# Patient Record
Sex: FEMALE | Race: WHITE | URBAN - NONMETROPOLITAN AREA
[De-identification: names, ages, dates, MRNs, and addresses within clinical notes are randomized per-mention and may not be internally consistent; named-entity substitution may affect disease eponyms.]

---

## 2017-05-10 ENCOUNTER — TRANSFERRED RECORDS (OUTPATIENT)
Dept: HEALTH INFORMATION MANAGEMENT | Facility: HOSPITAL | Age: 22
End: 2017-05-10

## 2017-05-10 ENCOUNTER — HOSPITAL ENCOUNTER (INPATIENT)
Facility: HOSPITAL | Age: 22
LOS: 5 days | Discharge: HOME | DRG: 885 | End: 2017-05-15
Attending: PSYCHIATRY & NEUROLOGY | Admitting: PSYCHIATRY & NEUROLOGY
Payer: COMMERCIAL

## 2017-05-10 DIAGNOSIS — F31.81 BIPOLAR 2 DISORDER (H): Primary | ICD-10-CM

## 2017-05-10 PROBLEM — F29 PSYCHOSIS (H): Status: ACTIVE | Noted: 2017-05-10

## 2017-05-10 LAB
ALBUMIN SERPL-MCNC: 4.2 G/DL (ref 3.4–5)
ALP SERPL-CCNC: 47 U/L (ref 40–150)
ALT SERPL W P-5'-P-CCNC: 23 U/L (ref 0–50)
AST SERPL W P-5'-P-CCNC: 22 U/L (ref 0–45)
BILIRUB DIRECT SERPL-MCNC: 0.2 MG/DL (ref 0–0.2)
BILIRUB SERPL-MCNC: 0.5 MG/DL (ref 0.2–1.3)
HCG UR QL: NEGATIVE
PROT SERPL-MCNC: 8.3 G/DL (ref 6.8–8.8)
TSH SERPL DL<=0.005 MIU/L-ACNC: 1.04 MU/L (ref 0.4–4)

## 2017-05-10 PROCEDURE — 80076 HEPATIC FUNCTION PANEL: CPT | Performed by: NURSE PRACTITIONER

## 2017-05-10 PROCEDURE — 82306 VITAMIN D 25 HYDROXY: CPT | Performed by: NURSE PRACTITIONER

## 2017-05-10 PROCEDURE — 25000132 ZZH RX MED GY IP 250 OP 250 PS 637: Performed by: NURSE PRACTITIONER

## 2017-05-10 PROCEDURE — 99223 1ST HOSP IP/OBS HIGH 75: CPT | Performed by: NURSE PRACTITIONER

## 2017-05-10 PROCEDURE — 25000125 ZZHC RX 250: Performed by: NURSE PRACTITIONER

## 2017-05-10 PROCEDURE — S0166 INJ OLANZAPINE 2.5MG: HCPCS | Performed by: NURSE PRACTITIONER

## 2017-05-10 PROCEDURE — 36415 COLL VENOUS BLD VENIPUNCTURE: CPT | Performed by: NURSE PRACTITIONER

## 2017-05-10 PROCEDURE — 84443 ASSAY THYROID STIM HORMONE: CPT | Performed by: NURSE PRACTITIONER

## 2017-05-10 PROCEDURE — 12400000 ZZH R&B MH

## 2017-05-10 PROCEDURE — 81025 URINE PREGNANCY TEST: CPT | Performed by: NURSE PRACTITIONER

## 2017-05-10 RX ORDER — LAMOTRIGINE 25 MG/1
25 TABLET ORAL DAILY
Status: DISCONTINUED | OUTPATIENT
Start: 2017-05-10 | End: 2017-05-15 | Stop reason: HOSPADM

## 2017-05-10 RX ORDER — ACETAMINOPHEN 325 MG/1
650 TABLET ORAL EVERY 4 HOURS PRN
Status: DISCONTINUED | OUTPATIENT
Start: 2017-05-10 | End: 2017-05-15 | Stop reason: HOSPADM

## 2017-05-10 RX ORDER — OLANZAPINE 10 MG/1
10 TABLET ORAL
Status: DISCONTINUED | OUTPATIENT
Start: 2017-05-10 | End: 2017-05-15 | Stop reason: HOSPADM

## 2017-05-10 RX ORDER — HYDROXYZINE HYDROCHLORIDE 25 MG/1
25-50 TABLET, FILM COATED ORAL EVERY 4 HOURS PRN
Status: DISCONTINUED | OUTPATIENT
Start: 2017-05-10 | End: 2017-05-15 | Stop reason: HOSPADM

## 2017-05-10 RX ORDER — LITHIUM CARBONATE 300 MG/1
300 TABLET, FILM COATED, EXTENDED RELEASE ORAL EVERY 12 HOURS SCHEDULED
Status: DISCONTINUED | OUTPATIENT
Start: 2017-05-10 | End: 2017-05-15 | Stop reason: HOSPADM

## 2017-05-10 RX ORDER — OLANZAPINE 10 MG/2ML
10 INJECTION, POWDER, FOR SOLUTION INTRAMUSCULAR
Status: DISCONTINUED | OUTPATIENT
Start: 2017-05-10 | End: 2017-05-15 | Stop reason: HOSPADM

## 2017-05-10 RX ORDER — ALUMINA, MAGNESIA, AND SIMETHICONE 2400; 2400; 240 MG/30ML; MG/30ML; MG/30ML
30 SUSPENSION ORAL EVERY 4 HOURS PRN
Status: DISCONTINUED | OUTPATIENT
Start: 2017-05-10 | End: 2017-05-15 | Stop reason: HOSPADM

## 2017-05-10 RX ORDER — TRAZODONE HYDROCHLORIDE 50 MG/1
50 TABLET, FILM COATED ORAL
Status: DISCONTINUED | OUTPATIENT
Start: 2017-05-10 | End: 2017-05-15 | Stop reason: HOSPADM

## 2017-05-10 RX ADMIN — LITHIUM CARBONATE 300 MG: 300 TABLET, FILM COATED, EXTENDED RELEASE ORAL at 19:31

## 2017-05-10 RX ADMIN — HYDROXYZINE HYDROCHLORIDE 50 MG: 25 TABLET ORAL at 18:43

## 2017-05-10 RX ADMIN — OLANZAPINE 10 MG: 10 INJECTION, POWDER, FOR SOLUTION INTRAMUSCULAR at 15:29

## 2017-05-10 RX ADMIN — LAMOTRIGINE 25 MG: 25 TABLET ORAL at 15:28

## 2017-05-10 ASSESSMENT — ACTIVITIES OF DAILY LIVING (ADL)
FALL_HISTORY_WITHIN_LAST_SIX_MONTHS: NO
RETIRED_EATING: 0-->INDEPENDENT
LAUNDRY: UNABLE TO COMPLETE
GROOMING: INDEPENDENT
DRESS: SCRUBS (BEHAVIORAL HEALTH)
RETIRED_COMMUNICATION: 0-->UNDERSTANDS/COMMUNICATES WITHOUT DIFFICULTY
SWALLOWING: 0-->SWALLOWS FOODS/LIQUIDS WITHOUT DIFFICULTY
COGNITION: 2 - DIFFICULTY WITH ORGANIZING THOUGHTS
TRANSFERRING: 0-->INDEPENDENT
BATHING: 0-->INDEPENDENT
DRESS: 0-->INDEPENDENT
TOILETING: 0-->INDEPENDENT
AMBULATION: 0-->INDEPENDENT
ORAL_HYGIENE: INDEPENDENT

## 2017-05-10 NOTE — PLAN OF CARE
"Problem: Goal Outcome Summary  Goal: Goal Outcome Summary  Pt arrived on unit at from [facility]. She was transported by Ambulance. She arrived here at 10 AM she is on 72 hour hold expires on 5/15/17 at 0530.  She lives in [facility] and when she left work yesterday she states that she went for a drive, the music was guiding her to keep driving. [place] and went to someone's house and asked if she could sleep there.  They told her no, so she laid down on their lawn, people called police and she was then transported to the [facility] ER.  Skin assessment completed scratch on right inner forearm. Pt denies SI HI hallucinations and pain.  She does admit to some depression and anxiety.  She is currently recently  from her . They are from [place], but moved here couple years ago as  had scholarship for [facility] for [employer].  She works at [employer] full time in [facility] [place]. She is hyper Episcopal and feels that she will be left behind\" pt is tearful, delayed with responses, poor tracking and flight of ideas. She complains of poor sleep.  States she has never done this before and does not know why it is happening.  She did state that she was hospitalized a couple years ago in [facility] [place] [facility], because she kept having strange thoughts about knives. She spent 2 days in that hospital.        Writer spoke to patients father, he reports that she is having marital trouble. He inquired if she was ok and if we had given her any medications. He reports that she is on sertraline.  He asked what had happened and when she got here.     [facility] pharmacy in [facility] called and clarified patients medications. Sertraline 150mg daily per pharmacy.     [phone].    1515:  Pt in lobby yelling and screaming praying to Ghanshyam, she pushed chairs across room.  Pt was resistant  to being escorted to locked unit control team called and pt was escorted to the back. 10 mg IM Zyprexa  administered in right deltoid at " 1525.  No hands on required.      1600: pt resting at this time.

## 2017-05-10 NOTE — PLAN OF CARE
ADMISSION NOTE    Reason for admission psychosis danger to self.  Safety concerns no.  Risk for or history of violence no.   Full skin assessment: completed no open areas    Patient arrived on unit from [facility] ER accompanied by ambulance transport on 5/10/2017  10:00 PM.   Status on arrival: 72 hour hold  There were no vitals taken for this visit. 140/74 100.4 118 98%  Patient given tour of unit and Welcome to  unit papers given to patient, wanding completed, belongings inventoried, and admission assessment completed.   Patient's legal status on arrival is 72 hour hold. Appropriate legal rights discussed with and copy given to patient. Patient Bill of Rights discussed with and copy given to patient.   Patient denies SI, HI, and thoughts of self harm and contracts for safety while on unit.      Argelia Wadsworth  5/10/2017  2:31 PM

## 2017-05-10 NOTE — PLAN OF CARE
Face to face end of shift report received from Argelia POPE RN. Rounding completed. Patient observed in ICU.     Sabiha Krishna  5/10/2017  4:38 PM

## 2017-05-10 NOTE — PROVIDER NOTIFICATION
05/10/17 1048   Patient Belongings   Did you bring any home meds/supplements to the hospital?  No   Patient Belongings clothing;shoes   Disposition of Belongings locked room   Belongings Search Yes   Clothing Search Yes   Second Staff Nikole MATTHEWS   General Info Comment 1 pair of panties, 1 pair of black flip flops, 1 bra, 1 pair of jeans, 1 black tank top, 1 stripped tank top, 1 long sleeved t-shirt    List items sent to safe: no belongings to the safe  All other belongings put in assigned cubby in belongings room.     I have reviewed my belongings list on admission and verify that it is correct.     Patient signature_______________________________    Second staff witness (if patient unable to sign) ______________________________       I have received all my belongings at discharge.    Patient signature________________________________    Eryn   5/10/2017  10:51 AM

## 2017-05-10 NOTE — PLAN OF CARE
"Problem: Discharge Planning  Goal: Discharge Planning (Adult, OB, Behavioral, Peds)  Writer met with pt to attempt to do Psychosocial Assessment- when writer asked pt how she ended up in [place] from [facility] pt launched into a long story which was disjointed. Pt presents as confused, delayed in speech and with loose associations. Pt stated that her  had asked her for a divorce and had graduated this weekend from [employer].  His parents were in town for the graduation so she allowed them to stay in her apartment while she went to stay with a friend for a few days. At some point she got in her car and just went for a drive and was listening to her music and connecting the songs to her life and eventually her phone  and she found herself out in the country and did not know where she was and was scared until she saw a cross in lights so she thought it was a sign from God so she followed it to a house and knocked on the door and asked the man who answered for a hug- the man refused and refused to let her in to sleep so she went to sleep on his lawn.  After a long, rambling story she abruptly launched into a prayer and continued to pray for approximately 10 minutes until writer interrupted her and told her it was time for lunch and ended the session without completing the assessment.      3:30 pm: Writer spoke with pt's father who stated he had asked the pt which pharmacy she uses- she stated [facility] but told her that she went off her medications 5 days ago because she wanted to \"purify her body.\"  Pt's father stated he told pt that \"it was okay to take medications- it doesn't mean that you do not trust in the lord to take care of you but your body is made up of chemicals and sometimes they get out of balance and you need medications.\"  He stated after he told his daughter this she seemed to settle down.  Writer asked if there was any mental illness in the family history and he stated his " wife had been diagnosed at the age of 18 with Bipolar disorder. He stated that as a teenage she had an eating disorder and went to treatment in [place]. He stated he wants to bring her home to live with them in [place].

## 2017-05-10 NOTE — PLAN OF CARE
"Social Service Psychosocial Assessment  Presenting Problem:    Mahsa is a 21 year-old,  female brought into the [facility] by police for bizarre behavior.  Per admission notes, \"21/F bib police after asking permission to sleep in a residence and being declined, Pt. Then went and fell asleep on the lawn. Police were called and Pt. Brought to ED. Pt. Reports not knowing why she was laying on the ground. Pt. Is from [facility] and when asked what brings her up to [place] she states \"it was something i wanted to do\" Pt. Was asking to speak w/ parents or  and reported she is  and unable to give enough information for ED to get in touch w/ family. While in ED Pt. Asking if GOD would forgive her for things she has done wrong in the past. Pt. Reported feeling concerned for the safety of her parents and  but couldn't say why. Pt. Also referencing \"END TIMES\" and Pt. Also talked about praying and how she got her words mixed up and was concerned GOD would think she was praying for the wrong thing. Pt. Reports hx of depression and reports no known OP services or current MH medication. 72hr hold , U tox neg\"    Marital Status: Patient has been  for 3 and a half years.  Recently her  asked her for a divorce and moved out.   Spouse / Children:  None  Psychiatric TX HX:  Pt has a history of depression and anxiety. Pt had an eating disorder as a teenager and went to an eating disorder treatment program in [place] for 6 weeks.  Pt was hospitalized 2 years ago at [facility].   Suicide Risk Assessment: On admission pt denied SI.  Today pt denies SI.  Pt denies prior SA.  Access to Lethal Means (explain):  Denies  Family Psych HX:  Pt's mother was diagnosed with Bipolar D/O at the age of 18.   A & Ox: 3  Medication Adherence:  Unknown  Medical Issues:  See H & P  Visual  Motor Functioning:  No problems needed  Communication Skills /Needs:  Pt presents with " delayed speech and loose associations.   Ethnicity:   White     Spirituality/Sabianist Affiliation:  Muslim   Clergy Request:  Yes   History:  None  Living Situation: Pt was living in an apartment in [place] with her  until he recently moved out.   ADL s: Independent  Education: Graduated with an accounting degree from [employer] [employer].   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   Leisure & Recreation:  Unknown  Childhood History: Grew up in [place] in an intact household.   Trauma Abuse HX:  Unknown  Relationship / Sexuality:  Pt identifies as heterosexual.  Substance Use/ Abuse:  On admission utox negative.  Chemical Dependency Treatment HX:  None  Legal Issues:  Divorce  Significant Life Events:  Pt's  recently asked for a divorce.   Strengths:  Family support, fly  Challenges /Limitation: lack of insight, poor coping skills.   Patient Support Contact (Include name, relationship, number, and summary of conversation):  Writer spoke with pt's father- see under writer's note.   Interventions:       Medical/Dental Care:     Medication Management: Set up with psychiatry- either [facility] or in [place], [place]    Clergy Request: yes    Commit/Pérez Screening: ?    Suicide Risk Assessment: On admission pt denied SI.  Today pt denies SI.  Pt denies prior SA.    High Risk Safety Plan: Talk to supports; Call crisis lines; Go to local ER if feeling suicidal.

## 2017-05-10 NOTE — H&P
"Psychiatric Eval/H&P  Patient Name: Mahsa Ahumada   YOB: 1995  Age: 21 year old  [id number]    Primary Physician: None   Completed By: Tomeka Mariano NP     CC: \"i dont know why i am here\"    HPI   Mahsa Ahumada is a 21 year old   female who presented via [facility] after someone had called the police because she fell asleep in somebody's lawn. [place] lives in [place] both started driving beucase she felt like \"that is what i was suppose to do.\" her cell phone went dead she became scared, and stopped at somebody's house at 1 in the morning and asked if she could come in.  They did not let her come in and then she was found sleeping on their lawn. When I am meeting with her on admission, she is pressurred and euphoric. She laughs multiple times and states she doesn't  Know why she is laughing. Her sleep has been very sporadic lately. When i asked why she drove from [place] to [facility] she states \"i just listened to the music on the radio and was empowered by it so i kept praying and driving.\" she states that songs on the radio were played specifically for her. She denied having any AH though did state she could speak with God. She is pressurred. She endorses racing thoughts. shehas had periods of depression in the past she has had periods of amotivation, anhedonia, worthlessness and excess guilt. She has had thoughts that she would be better off dead though has never attempted suicide nor has she had a plan. She has been diagnosed with ADHD and anxiety in the past. She has been taking her zoloft up until 2 days ago. She has no SI or HI.          [facility]     Past Psychiatric History: she has had 1 other inpatient psychiatric hospitalization 2 years ago in [place]. She has no  or [facility] worker.      Social History:   She was born and raised in [place]. [place] moved [place] to [place] because her boyfriend had a wresting " scholarship. She is  but currently . She and her  were living in [place]. They have been  3 years. They were high school sweethearts. She is opposed to the idea of a divorce. She did attend some college in [facility] though did not finish. She works at an accounting firm. They have no children. They rent an apt.      Chemical Use History: She is never used drugs.  She reported she only Been Intoxicatedon one occasion.           Medical History and ROS  No current outpatient prescriptions on file.     Allergies   Allergen Reactions     Penicillins Hives     No past medical history on file.  No past surgical history on file.      Physical Exam    Constitutional: oriented to person, place, and time.  appears well-developed and well-nourished.   HENT:   Head: Normocephalic and atraumatic.   Right Ear: External ear normal.   Left Ear: External ear normal.   Nose: Nose normal.   Mouth/Throat: Oropharynx is clear and moist. No oropharyngeal exudate.   Eyes: Conjunctivae and EOM are normal. Pupils are equal, round, and reactive to light. Right eye exhibits no discharge. Left eye exhibits no discharge. No scleral icterus.   Neck: Normal range of motion. Neck supple. No JVD present. No tracheal deviation present. No thyromegaly present.   Cardiovascular: Normal rate, regular rhythm, normal heart sounds and intact distal pulses. Exam reveals no gallop and no friction rub.   No murmur heard.  Pulmonary/Chest: Effort normal and breath sounds normal. No stridor. No respiratory distress.  no wheezes. no rales. no tenderness.   Abdominal: Soft. Bowel sounds are normal.  no distension and no mass. There is no tenderness. There is no rebound and no guarding.  Skin: Dry, intact, no open areas, rashes, moles of concern    Review of Systems:  Constitution: No weight loss, fever, night sweats  Skin: No rashes, pruritus or open wounds  Neuro: No headaches or seizure activity.  Psych:  See HPI  Eyes: No vision  changes.  ENT: No problems chewing or swallowing.   Musculoskeletal: No muscle pain, joint pain or swelling   Respiratory: No cough or dyspnea  Cardiovascular:  No chest pain,  palpitations or fainting  Gastrointestinal:  No abdominal pain, nausea, vomiting or change in bowel habits         MSE/PSYCH  PSYCHIATRIC EXAM  There were no vitals taken for this visit.  -Appearance/Behavior: well groomed.  Appears stated age  {attitude:pleasant and high strung  -Motor: fidgety.  -Gait: Normal.    -Abnormal involuntary movements: none.  -Mood: euphoric, elevated and grandiose.  -Affect: Inappropriate.  -Speech: Pressured .                 -Thought process/associations: Loose associations.  -Thought content: grandiose and religiously preoccupied.loose associations.  -Perceptual disturbances: No hallucinations..              -Suicidal/Homicidal Ideation: none  -Judgment: Limited.  -Insight: Limited.  *Orientation: time, place and person.  *Memory: intact  *Attention: limited  *Language: fluent, no aphasias, able to repeat phrases and name objects. Vocab intact.  *Fund of information: appropriate for education  *Cognitive functioning estimate: 0 - independent.     Labs: Her urine drug screen was negative, CBC was negative BMP was unremarkable.  Liver enzymes were not obtained nor was a urine pregnancy test     Assessment/Impression: This is a 21 year old yo female with current manic episode. She has grandiose and relious delusions.. When her delusions are challenged she does have some insight. She has no SI or HI and has never attempted suicide. She is willing to start medication to treat bipolar disorder  Educated regarding medication indications, risks, benefits, side effects, contraindications and possible interactions. Verbally expressed understanding.     DX:  Bipolar I most recent episode manic  bulemia nervosa in remission  Anorexia nervous in remission     Plan:  Start  Lithium 300 mg tonight then 300 bid   Start  lamictal 25 mg daily. When lamictal is at a therapuetic dose, plan will be to have her come off of lithium.  urine HCG  TSH  Vitamin D  LFT  We will try to retrieve records from [place] [facility] inpatient psychiatric unit from 2 years ago  Was educated on SJS     Anticipated length of stay:5 days

## 2017-05-10 NOTE — IP AVS SNAPSHOT
MRN:[id number]                      After Visit Summary   5/10/2017    Cammie Ahumada    MRN: [id number]           Thank you!     Thank you for choosing [facility] for your care. Our goal is always to provide you with excellent care. Hearing back from our patients is one way we can continue to improve our services. Please take a few minutes to complete the written survey that you may receive in the mail after you visit with us. Thank you!        Patient Information     Date Of Birth          1995        Designated Caregiver       Most Recent Value    Caregiver    Will someone help with your care after discharge? no      About your hospital stay     You were admitted on:  May 10, 2017 You last received care in the:  [facility]    You were discharged on:  May 15, 2017       Who to Call     For medical emergencies, please call 911.  For non-urgent questions about your medical care, please call your primary care provider or clinic, None          Attending Provider     Provider Specialty    Pankaj Mcpherson MD Psychiatry    Tess Lloyd NP Licensed Mental Health       Primary Care Provider    None       No address on file        Further instructions from your care team       Behavioral Discharge Planning and Instructions    Summary: Cammie was admitted with psychosis.     Main Diagnosis: Bipolar I most recent episode manic, bulemia nervosa in remission, Anorexia nervous in remission.    Major Treatments, Procedures and Findings: Stabilize with medications, connect with community programs.     Symptoms to Report: feeling more aggressive, increased confusion, losing more sleep, mood getting worse or thoughts of suicide    Lifestyle Adjustment: Take all medications as prescribed, meet with doctor/ medication provider, out patient therapist, , and [facility] worker as scheduled. Abstain from alcohol or any unprescribed drugs.     Psychiatry Follow-up:     [facility]  Medication Management - Kerry Villavicencio - May 26th, @ 1:30 pm.   [address]  [place], [place], [address]  Phone: [phone]  Fax: [phone]    [facility]  Medication Management - when back in area.   [address], [address]  [place] [address]  Phone: [phone]  Fax: [phone]               Resources:   Crisis Intervention: 497.840.7136 or 901-246-7147 (TTY: 856.921.4377).  Call anytime for help.  National [place] on Mental Illness (www.mn.zora.org): 261.487.4482 or 309-386-9234.  Alcoholics Anonymous (www.alcoholics-anonymous.org): Check your phone book for your local chapter.  Suicide Awareness Voices of Education (SAVE) (www.save.org): 664-921-LPVT (0822)  National Suicide Prevention Line (www.mentalhealthmn.org): 357-886-QKIZ (3242)  Mental Health Consumer/Survivor Network of [place] (www.mhcsn.net): 554.358.2042 or 598-807-0905  Mental Health Association of [place] (www.mentalhealth.org): 401.214.8746 or 435-791-0279    General Medication Instructions:   See your medication sheet(s) for instructions.   Take all medicines as directed.  Make no changes unless your doctor suggests them.   Go to all your doctor visits.  Be sure to have all your required lab tests. This way, your medicines can be refilled on time.  Do not use any drugs not prescribed by your doctor.  Avoid alcohol.    [place] Area:  [facility], [facility] stabilization [facility]- [phone]  [facility] Crisis Line: 1-331.757.2929  Advocates For Family Peace: 830-8349  Sexual Assault Program of [facility][1]: 574.897.5771 or 1-332.148.9943  [place] Battered Women's Program: 9-833-396-9261 Ext: 279       Calls answered Mon-Fri-8:00 am--4:30 pm    [place]:  Advocates for Family Peace: 1-955.988.3361  [facility] first call for help: 4-647-652-5714  [facility] Counseling Crisis Center:  (464) [phone]      [place] Area:  Warm Line: 1-165.469.8739       Calls answered Tuesday--Saturday 4:00 pm--10:00 pm  [facility]  "Line - 281-843-1616  [facility] Stabilization 168-584-6927    [place] Statewide:  [place] Crisis and Referral Services: 1-756.458.5956  National Suicide Prevention Lifeline: 7-984-962-TALK (4875)   - xsx3vczp- Text  Life  to [address]  First Call for Help:   [facility] Helpline- 3-129-YEAC-HELP     Pending Results     No orders found from 2017 to 2017.            Statement of Approval     Ordered          05/15/17 1107  I have reviewed and agree with all the recommendations and orders detailed in this document.  EFFECTIVE NOW     Approved and electronically signed by:  Tess Lloyd NP             Admission Information     Date & Time Department Dept. Phone    5/10/2017 [facility] [phone]      Your Vitals Were     Blood Pressure Pulse Temperature Respirations Height Weight    139/90 70 98.3  F (36.8  C) (Tympanic) 16 1.549 m (5' 1\") 54.7 kg (120 lb 9.6 oz)    Pulse Oximetry BMI (Body Mass Index)                98% 22.79 kg/m2          MyChart Information     [facility] lets you send messages to your doctor, view your test results, renew your prescriptions, schedule appointments and more. To sign up, go to [email or web address] . Click on \"Log in\" on the left side of the screen, which will take you to the Welcome page. Then click on \"Sign up Now\" on the right side of the page.     You will be asked to enter the access code listed below, as well as some personal information. Please follow the directions to create your username and password.     Your access code is: [email or web address]  Expires: 2017 11:13 AM     Your access code will  in 90 days. If you need help or a new code, please call your [facility] clinic or [phone].        Care EveryWhere ID     This is your Care EveryWhere ID. This could be used by other organizations to access your [facility] medical records  [id number]           Review of your medicines      START taking        Dose / Directions    lamoTRIgine 25 MG tablet "   Commonly known as:  LaMICtal   Used for:  Bipolar 2 disorder (H)        Dose:  25 mg   Take 1 tablet (25 mg) by mouth daily   Quantity:  30 tablet   Refills:  0       lithium 300 MG CR tablet   Commonly known as:  ESKALITH/LITHOBID   Used for:  Bipolar 2 disorder (H)        Dose:  300 mg   Take 1 tablet (300 mg) by mouth every 12 hours   Quantity:  60 tablet   Refills:  0       risperiDONE 2 MG ODT tab   Commonly known as:  risperDAL M-TABS   Used for:  Bipolar 2 disorder (H)        Dose:  2 mg   Place 1 tablet (2 mg) under the tongue At Bedtime   Quantity:  30 tablet   Refills:  0         STOP taking     SERTRALINE HCL PO                Where to get your medicines      These medications were sent to [facility] - [facility] - [address]  [address] [address]     Phone:  [phone]     lamoTRIgine 25 MG tablet    lithium 300 MG CR tablet    risperiDONE 2 MG ODT tab                Protect others around you: Learn how to safely use, store and throw away your medicines at www.disposemymeds.org.             Medication List: This is a list of all your medications and when to take them. Check marks below indicate your daily home schedule. Keep this list as a reference.      Medications           Morning Afternoon Evening Bedtime As Needed    lamoTRIgine 25 MG tablet   Commonly known as:  LaMICtal   Take 1 tablet (25 mg) by mouth daily   Last time this was given:  25 mg on 5/15/2017  9:10 AM                                lithium 300 MG CR tablet   Commonly known as:  ESKALITH/LITHOBID   Take 1 tablet (300 mg) by mouth every 12 hours   Last time this was given:  300 mg on 5/15/2017  9:10 AM                                risperiDONE 2 MG ODT tab   Commonly known as:  risperDAL M-TABS   Place 1 tablet (2 mg) under the tongue At Bedtime   Last time this was given:  1 mg on 5/14/2017  8:50 PM

## 2017-05-10 NOTE — IP AVS SNAPSHOT
[facility]    [address] [place] [id number]    Phone:  [phone]    Fax:  [phone]                                       After Visit Summary   5/10/2017    Cammie Ahumada    MRN: [id number]           After Visit Summary Signature Page     I have received my discharge instructions, and my questions have been answered. I have discussed any challenges I see with this plan with the nurse or doctor.    ..........................................................................................................................................  Patient/Patient Representative Signature      ..........................................................................................................................................  Patient Representative Print Name and Relationship to Patient    ..................................................               ................................................  Date                                            Time    ..........................................................................................................................................  Reviewed by Signature/Title    ...................................................              ..............................................  Date                                                            Time

## 2017-05-11 PROCEDURE — 12400000 ZZH R&B MH

## 2017-05-11 PROCEDURE — 25000132 ZZH RX MED GY IP 250 OP 250 PS 637: Performed by: NURSE PRACTITIONER

## 2017-05-11 RX ADMIN — HYDROXYZINE HYDROCHLORIDE 50 MG: 25 TABLET ORAL at 08:46

## 2017-05-11 RX ADMIN — LITHIUM CARBONATE 300 MG: 300 TABLET, FILM COATED, EXTENDED RELEASE ORAL at 08:45

## 2017-05-11 RX ADMIN — LITHIUM CARBONATE 300 MG: 300 TABLET, FILM COATED, EXTENDED RELEASE ORAL at 20:14

## 2017-05-11 RX ADMIN — TRAZODONE HYDROCHLORIDE 50 MG: 50 TABLET ORAL at 00:27

## 2017-05-11 RX ADMIN — HYDROXYZINE HYDROCHLORIDE 50 MG: 25 TABLET ORAL at 00:27

## 2017-05-11 RX ADMIN — LAMOTRIGINE 25 MG: 25 TABLET ORAL at 08:45

## 2017-05-11 RX ADMIN — HYDROXYZINE HYDROCHLORIDE 50 MG: 25 TABLET ORAL at 18:31

## 2017-05-11 ASSESSMENT — ACTIVITIES OF DAILY LIVING (ADL)
DRESS: SCRUBS (BEHAVIORAL HEALTH)
ORAL_HYGIENE: INDEPENDENT
GROOMING: INDEPENDENT

## 2017-05-11 NOTE — PLAN OF CARE
Face to face end of shift report received from RALPH Landis. Rounding completed. Patient observed.     Jarrett Snowden  5/11/2017  12:36 AM

## 2017-05-11 NOTE — PLAN OF CARE
Problem: Discharge Planning  Goal: Discharge Planning (Adult, OB, Behavioral, Peds)  Writer checked in with pt- pt stated she was doing good- she has been watching TV in the ICU. Writer told pt that her parents are driving up from [place] and will be here this weekend but her hold is not up until Monday and she could not leave until then. Pt stated she was happy to hear her parents were coming- writer told her that they would go up to [employer] and get her car and purse. Writer asked if she planned to go back to [place] or to [place]?  Pt stated she did not know.  Writer advised her to think about it and let writer know tomorrow so follow-up services could be set up.

## 2017-05-11 NOTE — PLAN OF CARE
"Problem: Goal Outcome Summary  Goal: Goal Outcome Summary  0030--to desk every few minutes. Religiously preoccupied and unable to direct conversation away from it. Pt believes the Sun will not rise in the morning because she did not do everything she was supposed to do and this is making her afraid. Given vistaril 50 mg po for the high-level of anxiety. Given trazodone for sleep. Staff have already had several conversations and therapeutic interactions with her and have been unable to effect any reality.sleep encouraged. 0100--toileted again and pt given an action plan to work on. Female staff are accompanying male staff into MHICU.0045--in bed with eyes closed and breathing regular.0611--slept about 4 hours. Is lying awake in bed and looking at the sunrise. She is grinning broadly and states; \"look, the Sun did rise !\". 0700--vitals obtained at this time; bp--155/77, pulse is 108 and tympanic temp is 99.1 F--sticky note to providers.      "

## 2017-05-11 NOTE — PLAN OF CARE
Face to face end of shift report received from Argelia ROSAS. Rounding completed. Patient observed.     Lynnette Kim  5/11/2017  6:14 PM

## 2017-05-11 NOTE — PLAN OF CARE
"Problem: Goal Outcome Summary  Goal: Goal Outcome Summary  Outcome: No Change  Patient pleasant and cooperative during assessment questioning. Denies SI, HI, anxiety, depression, hallucinations, and pain. States she is \"just tired\". Did sleep for some time, then woke to have dinner. Requested that writer pray with her, writer did. States she \"missed her molly\", and that she \"needs forgiveness from God\". Patient has been singing, and states she want to be baptized, that she previously was when young but does not remember it, and now needs to be baptized in the name of the Father, Son, and Holy Ghost. Patient did climb through the top of the bathroom door, stating she wanted to shower and get some water. Was told not to do that again, for fear of her getting hurt, that she would be able to take a shower, and staff is available with requests. Patient agreed that she will not climb through the door again, asking if it is ok to knock on the nurse's station window, was told yes, that is acceptable. Patient reports no injury and has no visible injuries noted. On call provider updated.   1845-patient accepted 50 mg Atarax PO.  1945-patient sleeping.     Problem: Thought Process Alteration (Adult)  Goal: Improved Thought Process  Pt Will verbalize that her thought process will be more clear by time of discharge   Outcome: No Change  Patient has unclear thought process.    Problem: Fall Risk (Adult)  Goal: Absence of Falls  Patient will remain free from falls while on unit during hospitalization.  [facility]  Patient will alert staff of need to use bathroom, need for water, or request to shower.  Outcome: No Change  Patient has remained free from falls, has not climbed through bathroom door opening in ICU, has alerted staff of needs.       "

## 2017-05-11 NOTE — PLAN OF CARE
Problem: Patient Goal: Nursing Focus  Goal: 1. Patient Goal: Nursing Focus  Weaning Careplan: Pt is not to wean to open unit for 24 hours after incident. After the 24 hour period pt may wean if behaviors remain appropriate.   Outcome: Therapy, progress toward functional goals is gradual  No weaning this shift

## 2017-05-11 NOTE — PLAN OF CARE
Problem: Goal Outcome Summary  Goal: Goal Outcome Summary  Outcome: Therapy, progress toward functional goals is gradual  Pt remains in MHICU for unpredictable behaviors.  Pt compliant with AM medications and also was administered 50 mg Hydroxyzine with AM meds. She denies SI HI hallucinations. Admits to depression and anxiety.  Writer talked with patient about her behaviors and informed her that she is not to climb the bathroom door again. She verbalized that she would not climb over the door again.  Pt reports sleeping well. And feels rested.  Informed patient that she is not allowed to come out of the MHICU for 24 from time of incident yesterday. She understands and apologized for her behaviors.     Spoke to patients dad updated him on her status.  They will be traveling to [place].     Problem: Depression (Adult,Obstetrics,Pediatric)  Intervention: Monitor/Manage Signs of Depression  Pt verbalizes depression but unable to rate.       Problem: Thought Process Alteration (Adult)  Goal: Improved Thought Process  Pt Will verbalize that her thought process will be more clear by time of discharge   Outcome: Therapy, progress towards functional goals is fair  Pt thoughts seem a bit more clear this shift. She remains religiously  preoccupied.     Problem: Fall Risk (Adult)  Goal: Absence of Falls  Patient will remain free from falls while on unit during hospitalization.  Patient will not [facility] through [facility]  Patient will alert staff of need to use bathroom, need for water, or request to shower.   No falls noted this shift. Pt verbalized that she will not climb over door anymore. Pt states she will request to use the bathroom.

## 2017-05-11 NOTE — PLAN OF CARE
Face to face end of shift report received from Eros RN. Rounding completed. Patient observed bed.     Argelia Wadsworth  5/11/2017  7:54 AM

## 2017-05-12 LAB — DEPRECATED CALCIDIOL+CALCIFEROL SERPL-MC: 15 UG/L (ref 20–75)

## 2017-05-12 PROCEDURE — 25000132 ZZH RX MED GY IP 250 OP 250 PS 637: Performed by: NURSE PRACTITIONER

## 2017-05-12 PROCEDURE — 12400000 ZZH R&B MH

## 2017-05-12 PROCEDURE — 99232 SBSQ HOSP IP/OBS MODERATE 35: CPT | Performed by: NURSE PRACTITIONER

## 2017-05-12 RX ORDER — RISPERIDONE 1 MG/1
1 TABLET, ORALLY DISINTEGRATING ORAL AT BEDTIME
Status: DISCONTINUED | OUTPATIENT
Start: 2017-05-12 | End: 2017-05-15 | Stop reason: HOSPADM

## 2017-05-12 RX ADMIN — HYDROXYZINE HYDROCHLORIDE 50 MG: 25 TABLET ORAL at 00:11

## 2017-05-12 RX ADMIN — LAMOTRIGINE 25 MG: 25 TABLET ORAL at 08:50

## 2017-05-12 RX ADMIN — TRAZODONE HYDROCHLORIDE 50 MG: 50 TABLET ORAL at 00:11

## 2017-05-12 RX ADMIN — LITHIUM CARBONATE 300 MG: 300 TABLET, FILM COATED, EXTENDED RELEASE ORAL at 20:40

## 2017-05-12 RX ADMIN — LITHIUM CARBONATE 300 MG: 300 TABLET, FILM COATED, EXTENDED RELEASE ORAL at 08:50

## 2017-05-12 RX ADMIN — RISPERIDONE 1 MG: 1 TABLET, ORALLY DISINTEGRATING ORAL at 20:40

## 2017-05-12 RX ADMIN — HYDROXYZINE HYDROCHLORIDE 50 MG: 25 TABLET ORAL at 23:02

## 2017-05-12 RX ADMIN — OLANZAPINE 10 MG: 10 TABLET, FILM COATED ORAL at 02:43

## 2017-05-12 ASSESSMENT — ACTIVITIES OF DAILY LIVING (ADL)
DRESS: SCRUBS (BEHAVIORAL HEALTH)
GROOMING: INDEPENDENT
ORAL_HYGIENE: INDEPENDENT
LAUNDRY: UNABLE TO COMPLETE

## 2017-05-12 NOTE — PLAN OF CARE
"Problem: Goal Outcome Summary  Goal: Goal Outcome Summary  Outcome: Therapy, progress toward functional goals is gradual  Patient [place] [facility] area. States she is doing a little better. Denies any hallucinations. States she is just\" hearing her own thoughts now. \" Has no insight into what she did. States she was just listening to the radio and it directed her to drive where she was. Patient states she is in [facility] and was reminded it was [place]. Did know the date. Talked with sister and parents briefly on the phone. Reminded of not entering others rooms and asking to use bathroom. Patient was agreeable. Has no physical complaints. Has had no outbursts. Did admit to high anxiety. Received Atarax 50 mg with good results. Talked to sister about being afraid to go to sleep and not being able to wake up. Staff talked with patient about being safe here and given reassurance. Patient agreeable.    Problem: Thought Process Alteration (Adult)  Goal: Improved Thought Process  Pt Will verbalize that her thought process will be more clear by time of discharge   Outcome: Therapy, progress toward functional goals is gradual  Patient states she is doing better. Acknowledges her own thoughts and denies hallucinations.     Problem: Fall Risk (Adult)  Goal: Absence of Falls  Patient will remain free from falls while on unit during hospitalization.  Patient will not climb through bathroom door opening in [facility].  Patient will alert staff of need to use bathroom, need for water, or request to shower.   Outcome: Improving  Patient is steady on feet. Has not climbed through bathroom door opening.  Alerts staff to use bathroom.    Problem: Patient Goal: Nursing Focus  Goal: 1. Patient Goal: Nursing Focus  Weaning Careplan: Pt is not to wean to open unit for 24 hours after incident. After the 24 hour period pt may wean if behaviors remain appropriate.   Outcome: Improving  Has not weaned this evening. Is cooperative.      "

## 2017-05-12 NOTE — PLAN OF CARE
Face to face end of shift report received from Eros HANSON RN. Rounding completed. Patient observed in MHICU room.     Sabiha Krishna  5/12/2017  7:46 AM

## 2017-05-12 NOTE — PLAN OF CARE
Problem: Discharge Planning  Goal: Discharge Planning (Adult, OB, Behavioral, Peds)  Writer spoke with pt's father Duane Prescott who is driving up from [place] to see pt- he stated they should arrive around 6pm tonight.  Writer asked if he had spoken with pt about moving back to [place] with them?  He stated they had not but would when they come to visit.  Writer informed him that a mental health clinic had been located nearby their home in [place] where pt could receive psychiatry and therapy services if she returns to [place].

## 2017-05-12 NOTE — PLAN OF CARE
Face to face end of shift report received from RALPH Church. Rounding completed. Patient observed.     Jarrett Snowden  5/12/2017  12:13 AM

## 2017-05-12 NOTE — PROGRESS NOTES
[facility]  Psychiatric Progress Note      Impression:   This is a 21 year old female with current manic episode. Today Cammie is calm and soft spoken. She denies any current hallucinations. Cammie is however somewhat preoccupied with slightly delayed speech. She does say she has had paranoid thoughts that people are talking about her. She is willing to take medications. She is also willing to establish individual therapy and medication managment in the [facility] [place]. She does feel as though the medications she is currently taking are helpful but she still is not sleeping well. Will add some risperdal at bedtime to see if this is beneficial for reducing paranoid thoughts and thought organization.     Educated regarding medication indications, risks, benefits, side effects, contraindications and possible interactions. Verbally expressed understanding.        DIagnoses:     Bipolar I most recent episode manic  bulemia nervosa in remission  Anorexia nervous in remission     Attestation:  Patient has been seen and evaluated by me,  Tess Lloyd NP          Interim History:   The patient's care was discussed with the treatment team and chart notes were reviewed.          Medications:     Current Facility-Administered Medications Ordered in Epic   Medication Dose Route Frequency Last Rate Last Dose     hydrOXYzine (ATARAX) tablet 25-50 mg  25-50 mg Oral Q4H PRN   50 mg at 05/12/17 0011     acetaminophen (TYLENOL) tablet 650 mg  650 mg Oral Q4H PRN         alum & mag hydroxide-simethicone (MYLANTA ES/MAALOX  ES) suspension 30 mL  30 mL Oral Q4H PRN         magnesium hydroxide (MILK OF MAGNESIA) suspension 30 mL  30 mL Oral At Bedtime PRN         traZODone (DESYREL) tablet 50 mg  50 mg Oral At Bedtime PRN   50 mg at 05/12/17 0011     OLANZapine (zyPREXA) tablet 10 mg  10 mg Oral Q2H PRN   10 mg at 05/12/17 0243    Or     OLANZapine (zyPREXA) injection 10 mg  10 mg Intramuscular Q2H PRN   10 mg at 05/10/17  1529     nicotine polacrilex (NICORETTE) gum 2-4 mg  2-4 mg Buccal Q1H PRN         lithium (ESKALITH/LITHOBID) CR tablet 300 mg  300 mg Oral Q12H YAQUELIN   300 mg at 05/12/17 0850     lamoTRIgine (LaMICtal) tablet 25 mg  25 mg Oral Daily   25 mg at 05/12/17 0850     No current Epic-ordered outpatient prescriptions on file.              10 point ROS negative        Allergies:     Allergies   Allergen Reactions     Penicillins Hives            Psychiatric Examination:   /80  Pulse 99  Temp 99.4  F (37.4  C) (Tympanic)  Resp 13  SpO2 100%  Weight is 0 lbs 0 oz  There is no height or weight on file to calculate BMI.    Appearance:  awake, alert, adequately groomed and dressed in hospital scrubs  Attitude:  cooperative  Eye Contact:  good  Mood:  anxious  Affect:  mood congruent  Speech:  clear, coherent but soft and slightly delayed  Psychomotor Behavior:  no evidence of tardive dyskinesia, dystonia, or tics and intact station, gait and muscle tone  Thought Process:  linear and goal oriented  Associations:  no loose associations  Thought Content:  no evidence of suicidal ideation or homicidal ideation and patient appears to be responding to internal stimuli  Insight:  fair  Judgment:  fair  Oriented to:  time, person, and place  Attention Span and Concentration:  intact  Recent and Remote Memory:  intact  Fund of Knowledge: delayed  Muscle Strength and Tone: normal  Gait and Station: Normal           Labs:     Results for orders placed or performed during the hospital encounter of 05/10/17 (from the past 48 hour(s))   Hepatic panel   Result Value Ref Range    Bilirubin Direct 0.2 0.0 - 0.2 mg/dL    Bilirubin Total 0.5 0.2 - 1.3 mg/dL    Albumin 4.2 3.4 - 5.0 g/dL    Protein Total 8.3 6.8 - 8.8 g/dL    Alkaline Phosphatase 47 40 - 150 U/L    ALT 23 0 - 50 U/L    AST 22 0 - 45 U/L   TSH with free T4 reflex   Result Value Ref Range    TSH 1.04 0.40 - 4.00 mU/L   HCG qualitative urine   Result Value Ref Range    HCG  Qual Urine Negative NEG                Plan:   Will start risperdal 1 mg at bedtime and increase as tolerated

## 2017-05-12 NOTE — PLAN OF CARE
"Problem: Goal Outcome Summary  Goal: Goal Outcome Summary  Outcome: No Change  Patient pleasant and cooperative during assessment questioning. Denies SI, HI, anxiety, depression, and hallucinations. Admits to pain on both sides of abdomen, unable to rate, states \"it is just when I push here\", no signs of injury or bruising. States she did not get much sleep last night, hopes to nap some today. States she is excited for her parents to visit this evening, and is unsure of her discharge plan, whether she will stay in [place], or go back to [place]. Did shower.   Parents driving from [place] to visit patient today. Per treatment team: ok for parents to visit any time they arrive on unit.  Vitamin D value of 15, provider updated.   Patient on open unit, observed drinking glass after glass of water, continually requesting to use her bathroom. Provider updated, staff to monitor her water intake.    Problem: Thought Process Alteration (Adult)  Goal: Improved Thought Process  Pt Will verbalize that her thought process will be more clear by time of discharge   Outcome: Improving  Patient's thought process is clearer today, able to have a reality based conversation.    Problem: Fall Risk (Adult)  Goal: Absence of Falls  Patient will remain free from falls while on unit during hospitalization.  Patient will not climb through bathroom door opening in [facility].  Patient will alert staff of need to use bathroom, need for water, or request to shower.   Outcome: Improving  Patient has remained free from falls, and has not attempted to climb through bathroom door opening.   Patient has alerted staff to requests.    Problem: Patient Goal: Nursing Focus  Goal: 1. Patient Goal: Nursing Focus  Weaning Careplan: Pt is not to wean to open unit for 24 hours after incident. After the 24 hour period pt may wean if behaviors remain appropriate.   Outcome: Improving  Patient able to wean to the open unit.      "

## 2017-05-12 NOTE — PLAN OF CARE
Problem: Goal Outcome Summary  Goal: Goal Outcome Summary  0015--pt receiving trazodone 50 mg po and vistaril 50 mg po for sleep and anxiety [ rated at 8 on 0-10 scale ] [facility] , watching tv and eating a snack and states she is going to stay up all night. tv shut off and pt directed to her room. The other pt in [facility] is male and his room door is locked from the outside to prevent this pt from going into any other pt rooms. Pt has very poor insight and is very impulsive. Pt accepted the meds and went to his room. 0100--toileted. Still very much disorganized and animated. Still up to desk frequently with multiple requests. 0245--requesting another medication for sleep. Pt appears hypomanic and and is getting somewhat agitated; given zyprexa 10 mg po.toileted several times. 0330--in bed with eyes closed and breathing regular. 0650--still in bed with eyes closed and breathing regular.

## 2017-05-12 NOTE — PLAN OF CARE
Face to face end of shift report received from Sabiha ROSAS. Rounding completed. Patient observed in room and introduced to nursing for the shift.    Jeremi Wallis  5/12/2017  4:19 PM

## 2017-05-12 NOTE — PLAN OF CARE
Problem: Goal Outcome Summary  Goal: Goal Outcome Summary  Outcome: Improving  Pt has been up and active this shift. She has attended all available groups.  She was given teaching on Lithium with two handouts. She was given feedback to keep her fluid intake moderated since she has been drinking large amounts of water and coffee.  I explained that she will have labs drawn to determine if her sodium is low.  Pt shared with me that her change in behavior recently scared her and she wants to be stable.  She is not sure what she is going to do as far as living situation or work. She was encouraged to take her time and be patient.  Pt agrees that staying a few days to make sure her Lithium level is stable.  Pt was given teaching on Risperdal as well.    Pt visited with her parent this evening for about an hour. They are very supportive and are staying in a hotel until Monday.      2300 Pt woke and was wandering around and complained of anxiety. She was given 50 mg of Atarax PRN for her anxiety.    Problem: Depression (Adult,Obstetrics,Pediatric)  Goal: Identify Related Risk Factors and Signs and Symptoms  Related risk factors and signs and symptoms are identified upon initiation of Human Response Clinical Practice Guideline (CPG)   Outcome: Improving  Pt reports that her depression is improving.    Problem: Thought Process Alteration (Adult)  Goal: Improved Thought Process  Pt Will verbalize that her thought process will be more clear by time of discharge   Outcome: Improving  Pt is able to follow conversation logically and has some insight.    Problem: Fall Risk (Adult)  Goal: Absence of Falls  Patient will remain free from falls while on unit during hospitalization.  Patient will not climb through bathroom door opening in [facility].  Patient will alert staff of need to use bathroom, need for water, or request to shower.   Outcome: Improving  Pt has been steady on her feet    Problem: Patient Goal: Nursing Focus  Goal: 1.  Patient Goal: Nursing Focus  5/12/17-Weaning Careplan: Patient able to wean to the open unit if behaviors are appropriate.   Outcome: Completed Date Met:  05/12/17  Pt moved to open unit

## 2017-05-13 LAB
ANION GAP SERPL CALCULATED.3IONS-SCNC: 8 MMOL/L (ref 3–14)
BUN SERPL-MCNC: 7 MG/DL (ref 7–30)
CALCIUM SERPL-MCNC: 9 MG/DL (ref 8.5–10.1)
CHLORIDE SERPL-SCNC: 104 MMOL/L (ref 94–109)
CO2 SERPL-SCNC: 27 MMOL/L (ref 20–32)
CREAT SERPL-MCNC: 0.68 MG/DL (ref 0.52–1.04)
GFR SERPL CREATININE-BSD FRML MDRD: NORMAL ML/MIN/1.7M2
GLUCOSE SERPL-MCNC: 91 MG/DL (ref 70–99)
POTASSIUM SERPL-SCNC: 4.1 MMOL/L (ref 3.4–5.3)
SODIUM SERPL-SCNC: 139 MMOL/L (ref 133–144)

## 2017-05-13 PROCEDURE — 36415 COLL VENOUS BLD VENIPUNCTURE: CPT | Performed by: NURSE PRACTITIONER

## 2017-05-13 PROCEDURE — 25000132 ZZH RX MED GY IP 250 OP 250 PS 637: Performed by: NURSE PRACTITIONER

## 2017-05-13 PROCEDURE — 80048 BASIC METABOLIC PNL TOTAL CA: CPT | Performed by: NURSE PRACTITIONER

## 2017-05-13 PROCEDURE — 12400000 ZZH R&B MH

## 2017-05-13 RX ADMIN — TRAZODONE HYDROCHLORIDE 50 MG: 50 TABLET ORAL at 00:03

## 2017-05-13 RX ADMIN — RISPERIDONE 1 MG: 1 TABLET, ORALLY DISINTEGRATING ORAL at 20:36

## 2017-05-13 RX ADMIN — OLANZAPINE 10 MG: 10 TABLET, FILM COATED ORAL at 00:03

## 2017-05-13 RX ADMIN — LITHIUM CARBONATE 300 MG: 300 TABLET, FILM COATED, EXTENDED RELEASE ORAL at 08:14

## 2017-05-13 RX ADMIN — LITHIUM CARBONATE 300 MG: 300 TABLET, FILM COATED, EXTENDED RELEASE ORAL at 20:36

## 2017-05-13 RX ADMIN — LAMOTRIGINE 25 MG: 25 TABLET ORAL at 08:14

## 2017-05-13 RX ADMIN — OLANZAPINE 10 MG: 10 TABLET, FILM COATED ORAL at 23:25

## 2017-05-13 RX ADMIN — HYDROXYZINE HYDROCHLORIDE 50 MG: 25 TABLET ORAL at 23:07

## 2017-05-13 ASSESSMENT — ACTIVITIES OF DAILY LIVING (ADL)
GROOMING: INDEPENDENT
GROOMING: INDEPENDENT
DRESS: SCRUBS (BEHAVIORAL HEALTH);INDEPENDENT
ORAL_HYGIENE: INDEPENDENT
ORAL_HYGIENE: INDEPENDENT

## 2017-05-13 NOTE — PLAN OF CARE
Problem: Goal Outcome Summary  Goal: Goal Outcome Summary  BEHAVIORAL TEAM DISCUSSION     Continued Stay Criteria/Rationale: on 72 hour hold due to psychosis  Plan: stabilize on medications  Participants: nursing and psychiatric nurse practitioner  Summary/Recommendation: Continue to stabilize with medication and work on discharge planning.  Medical/Physical: see H&P  Progress: some improvement

## 2017-05-13 NOTE — PLAN OF CARE
Face to face end of shift report received from Kelsea SANCHEZ RN. Rounding completed. Patient observed in the group room.    Aliyah Bazan  5/13/2017  3:45 PM

## 2017-05-13 NOTE — PROGRESS NOTES
CHIO AYALA  - Requested visit by .  Short visit but cordial and willing to share her story.  Recent loss of  through abandonment and her search by car from [place] to [place] where she was taken into custody.  Patient appeared rational and desirous of sharing.  Will visit with her again at her request.

## 2017-05-13 NOTE — PLAN OF CARE
"Problem: Goal Outcome Summary  Goal: Goal Outcome Summary  Outcome: No Change  Patient has been up on the unit and has been participating in therapeutic group. Patient endorsed racing thoughts and stated that her \"thoughts scare her\". Patient stated \"I feel like I'm going to die in my sleep. I worry about not being baptized and I'm afraid of people being left behind.\" Patient was very religiously preoccupied this shift. Patient also verbalized that she feels \"scared\" up here and feels like she is \"in longterm\". She had a visit from her parents this shift. She denied depression and suicidal ideation. At 2300, patient was observed  walking in the hallway with her head bowed and eyes closed and kept repeating \"Lord - I am in the hospital and I am safe!\" Patient endorsed anxiety. She was unable to rate her anxiety level on the numeric scale. She stated \"I don't know - what do you think? Will you help me!\" Patient was given 50 mg of PRN Atarax at 2307. At 2315, patient came up to the nurses station and asked \"Can I get baptized now?\" Patient was informed that she will have to do that after discharge, but that I could arrange a spiritual consult for her! At 2022, patient stated that she is \"scared and worried that she is going to die in her sleep!\" Patient did endorse auditory hallucinations. She stated that voices are telling her that she is going to die in her sleep. Patient also expressed concern that she is going to hurt someone. When asked if she wanted to hurt anyone in particular, she mentioned the names of two peers on the unit. Patient agreed to remain in control and was given PRN Zyprexa at 2325. Patient also stated \"when I read things, I sometimes see words change!\"     Problem: Thought Process Alteration (Adult)  Goal: Improved Thought Process  Pt Will verbalize that her thought process will be more clear by time of discharge   Outcome: No Change  Patient endorses racing thoughts.       "

## 2017-05-13 NOTE — PLAN OF CARE
Face to face end of shift report received from Svitlana LEWIS RN. Rounding completed. Patient observed.     Kelsea Mcgrath  5/13/2017  7:34 AM

## 2017-05-13 NOTE — PLAN OF CARE
Face to face end of shift report received from Jeremi ROSAS. Rounding completed. Patient observed in [facility].     Svitlana Dent  5/12/2017  11:28 PM

## 2017-05-13 NOTE — PLAN OF CARE
Problem: Goal Outcome Summary  Goal: Goal Outcome Summary  Pt awake at beginning of shift, requesting medications to help her sleep, Trazodone 50 and Zyprexa 10 given at 0003. Pt then went to bed and appeared to be sleeping with normal respirations and position changes noted.

## 2017-05-13 NOTE — PLAN OF CARE
Problem: Goal Outcome Summary  Goal: Goal Outcome Summary  Pt. Has been up and about on unit, speech is slightly delayed, denies hallucinations, but states she talks to god frequently, denies depression, anxiety and SI, spending time in dayroom, appetite good, denies pain or any physical problems, encouraged to attend group, polite and cooperative, will continue to monitor.    Problem: Thought Process Alteration (Adult)  Goal: Improved Thought Process  Pt Will verbalize that her thought process will be more clear by time of discharge   Outcome: Therapy, progress toward functional goals is gradual  Pt. States she has racing thoughts occasionally, not at this time.    Problem: Fall Risk (Adult)  Goal: Absence of Falls  Patient will remain free from falls while on unit during hospitalization.  Patient [facility]  Patient will alert staff of need to use bathroom, need for water, or request to shower.   Outcome: Improving  Pt. Has remained free from falls, has a room on the open unit, asks staff when she needs to shower

## 2017-05-14 PROCEDURE — 25000132 ZZH RX MED GY IP 250 OP 250 PS 637: Performed by: NURSE PRACTITIONER

## 2017-05-14 PROCEDURE — 99232 SBSQ HOSP IP/OBS MODERATE 35: CPT | Performed by: NURSE PRACTITIONER

## 2017-05-14 PROCEDURE — 12400000 ZZH R&B MH

## 2017-05-14 RX ADMIN — OLANZAPINE 10 MG: 10 TABLET, FILM COATED ORAL at 08:04

## 2017-05-14 RX ADMIN — LITHIUM CARBONATE 300 MG: 300 TABLET, FILM COATED, EXTENDED RELEASE ORAL at 08:04

## 2017-05-14 RX ADMIN — LITHIUM CARBONATE 300 MG: 300 TABLET, FILM COATED, EXTENDED RELEASE ORAL at 20:51

## 2017-05-14 RX ADMIN — RISPERIDONE 1 MG: 1 TABLET, ORALLY DISINTEGRATING ORAL at 20:50

## 2017-05-14 RX ADMIN — LAMOTRIGINE 25 MG: 25 TABLET ORAL at 08:04

## 2017-05-14 ASSESSMENT — ACTIVITIES OF DAILY LIVING (ADL)
GROOMING: INDEPENDENT
DRESS: SCRUBS (BEHAVIORAL HEALTH);INDEPENDENT
ORAL_HYGIENE: INDEPENDENT
ORAL_HYGIENE: INDEPENDENT
GROOMING: INDEPENDENT

## 2017-05-14 NOTE — PLAN OF CARE
Face to face end of shift report received from Svitlana ROSAS. Rounding completed. Patient observed.     Kelsea Mcgrath  5/14/2017  7:53 AM

## 2017-05-14 NOTE — PROGRESS NOTES
[facility]  Psychiatric Progress Note      Impression:   This is a 21 year old female with current manic episode. Cammie is again calm and cooperative with unit programming. She participates in programming and is social with peers and staff. She does tell me that she believes others can hear her thoughts. Discussed with Cammie that this is not rational and she is able to reason this out in her mind. Cammie feels the medications are helping her a bit better but she does still have these paranoid thoughts of people reading her thoughts. She is in agreement with an increase in risperdal at bedtime.   Educated regarding medication indications, risks, benefits, side effects, contraindications and possible interactions. Verbally expressed understanding.        DIagnoses:     Bipolar I most recent episode manic  bulemia nervosa in remission  Anorexia nervous in remission     Attestation:  Patient has been seen and evaluated by me,  Tess Lloyd NP          Interim History:   The patient's care was discussed with the treatment team and chart notes were reviewed.          Medications:     Current Facility-Administered Medications Ordered in Epic   Medication Dose Route Frequency Last Rate Last Dose     hydrOXYzine (ATARAX) tablet 25-50 mg  25-50 mg Oral Q4H PRN   50 mg at 05/12/17 0011     acetaminophen (TYLENOL) tablet 650 mg  650 mg Oral Q4H PRN         alum & mag hydroxide-simethicone (MYLANTA ES/MAALOX  ES) suspension 30 mL  30 mL Oral Q4H PRN         magnesium hydroxide (MILK OF MAGNESIA) suspension 30 mL  30 mL Oral At Bedtime PRN         traZODone (DESYREL) tablet 50 mg  50 mg Oral At Bedtime PRN   50 mg at 05/12/17 0011     OLANZapine (zyPREXA) tablet 10 mg  10 mg Oral Q2H PRN   10 mg at 05/12/17 0243    Or     OLANZapine (zyPREXA) injection 10 mg  10 mg Intramuscular Q2H PRN   10 mg at 05/10/17 1529     nicotine polacrilex (NICORETTE) gum 2-4 mg  2-4 mg Buccal Q1H PRN         lithium  "(ESKALITH/LITHOBID) CR tablet 300 mg  300 mg Oral Q12H YAQUELIN   300 mg at 05/12/17 0850     lamoTRIgine (LaMICtal) tablet 25 mg  25 mg Oral Daily   25 mg at 05/12/17 0850     No current Epic-ordered outpatient prescriptions on file.              10 point ROS negative        Allergies:     Allergies   Allergen Reactions     Penicillins Hives            Psychiatric Examination:   /83  Pulse 95  Temp 98.3  F (36.8  C) (Tympanic)  Resp 18  Ht 1.549 m (5' 1\")  Wt 54.7 kg (120 lb 9.6 oz)  SpO2 98%  BMI 22.79 kg/m2  Weight is 120 lbs 9.6 oz  Body mass index is 22.79 kg/(m^2).    Appearance:  awake, alert, adequately groomed and dressed in hospital scrubs  Attitude:  cooperative  Eye Contact:  good  Mood: less anxious  Affect:  mood congruent  Speech:  clear, coherent but soft  Psychomotor Behavior:  no evidence of tardive dyskinesia, dystonia, or tics and intact station, gait and muscle tone  Thought Process:  linear and goal oriented  Associations:  no loose associations  Thought Content: No SI/Hi but fleming shave paranoid thoughts about people being able to hear her negative thoughts.  Insight:  fair  Judgment:  fair  Oriented to:  time, person, and place  Attention Span and Concentration:  intact  Recent and Remote Memory:  intact  Fund of Knowledge: delayed  Muscle Strength and Tone: normal  Gait and Station: Normal           Labs:     Results for orders placed or performed during the hospital encounter of 05/10/17 (from the past 48 hour(s))   Basic metabolic panel   Result Value Ref Range    Sodium 139 133 - 144 mmol/L    Potassium 4.1 3.4 - 5.3 mmol/L    Chloride 104 94 - 109 mmol/L    Carbon Dioxide 27 20 - 32 mmol/L    Anion Gap 8 3 - 14 mmol/L    Glucose 91 70 - 99 mg/dL    Urea Nitrogen 7 7 - 30 mg/dL    Creatinine 0.68 0.52 - 1.04 mg/dL    GFR Estimate >90  Non  GFR Calc   >60 mL/min/1.7m2    GFR Estimate If Black >90   GFR Calc   >60 mL/min/1.7m2    Calcium 9.0 8.5 - 10.1 " mg/dL                Plan:   Will increase risperdal 2 mg at bedtime

## 2017-05-14 NOTE — PLAN OF CARE
"Problem: Goal Outcome Summary  Goal: Goal Outcome Summary  Pt. Has been up and about on unit, denies depression, has some anxiety and \"voices of angels\", medicated with zyprexa 10 mg po at 0804 for the voices, pleasant and cooperative with assessments and is social on unit, attending most groups, will continue to monitor.    Problem: Thought Process Alteration (Adult)  Goal: Improved Thought Process  Pt Will verbalize that her thought process will be more clear by time of discharge   Outcome: Therapy, progress toward functional goals is gradual  Minimal improvement      "

## 2017-05-14 NOTE — PLAN OF CARE
Pt was given 10 mg of Zyprexa PO for her psychotic symptoms and agitation.  Pt agreed that Ghanshyam is forgiving and compassionate and does not hurt anyone.  She said she will be like Ghanshyam.

## 2017-05-14 NOTE — PLAN OF CARE
Face to face end of shift report received from Jeremi ROSAS. Rounding completed. Patient observed in [facility].     Svitlana Dent  5/13/2017  11:42 PM

## 2017-05-14 NOTE — PLAN OF CARE
Problem: Goal Outcome Summary  Goal: Goal Outcome Summary  Pt awake at beginning of shift walking in hallways. At approximately 2350 went into room 552, turned light on and turned back around and walked out. Pt requested headphones and continued walking hallways while listening to music. Pt given a snack around 0100. Pt went to bed around 0200 and is currently appears to be sleeping with normal respirations and position changes noted.

## 2017-05-14 NOTE — PLAN OF CARE
Face to face end of shift report received from Kelsea SANCHEZ RN. Rounding completed. Patient observed in the lounge.     Aliyah Bazan  5/14/2017  4:13 PM

## 2017-05-15 VITALS
BODY MASS INDEX: 22.77 KG/M2 | WEIGHT: 120.6 LBS | RESPIRATION RATE: 16 BRPM | SYSTOLIC BLOOD PRESSURE: 139 MMHG | HEART RATE: 70 BPM | DIASTOLIC BLOOD PRESSURE: 90 MMHG | TEMPERATURE: 98.3 F | OXYGEN SATURATION: 98 % | HEIGHT: 61 IN

## 2017-05-15 PROCEDURE — 99239 HOSP IP/OBS DSCHRG MGMT >30: CPT | Performed by: NURSE PRACTITIONER

## 2017-05-15 PROCEDURE — 25000132 ZZH RX MED GY IP 250 OP 250 PS 637: Performed by: NURSE PRACTITIONER

## 2017-05-15 RX ORDER — LAMOTRIGINE 25 MG/1
25 TABLET ORAL DAILY
Qty: 30 TABLET | Refills: 0 | Status: SHIPPED | OUTPATIENT
Start: 2017-05-15

## 2017-05-15 RX ORDER — LITHIUM CARBONATE 300 MG/1
300 TABLET, FILM COATED, EXTENDED RELEASE ORAL EVERY 12 HOURS
Qty: 60 TABLET | Refills: 0 | Status: SHIPPED | OUTPATIENT
Start: 2017-05-15

## 2017-05-15 RX ORDER — RISPERIDONE 2 MG/1
2 TABLET, ORALLY DISINTEGRATING ORAL AT BEDTIME
Qty: 30 TABLET | Refills: 0 | Status: SHIPPED | OUTPATIENT
Start: 2017-05-15

## 2017-05-15 RX ADMIN — HYDROXYZINE HYDROCHLORIDE 50 MG: 25 TABLET ORAL at 02:27

## 2017-05-15 RX ADMIN — LITHIUM CARBONATE 300 MG: 300 TABLET, FILM COATED, EXTENDED RELEASE ORAL at 09:10

## 2017-05-15 RX ADMIN — LAMOTRIGINE 25 MG: 25 TABLET ORAL at 09:10

## 2017-05-15 RX ADMIN — OLANZAPINE 10 MG: 10 TABLET, FILM COATED ORAL at 02:27

## 2017-05-15 ASSESSMENT — ACTIVITIES OF DAILY LIVING (ADL)
LAUNDRY: UNABLE TO COMPLETE
DRESS: INDEPENDENT
GROOMING: INDEPENDENT
ORAL_HYGIENE: INDEPENDENT

## 2017-05-15 NOTE — PLAN OF CARE
Face to face end of shift report received from Svitlana BEE RN. Rounding completed. Patient observed in [facility].     Neeta Prado  5/15/2017  7:54 AM

## 2017-05-15 NOTE — DISCHARGE SUMMARY
"Psychiatric Discharge Summary    Cammie Ahumada MRN# [id number]   Age: 21 year old YOB: 1995     Date of Admission:  5/10/2017  Date of Discharge:  5/15/2017  Admitting Physician:  Pankaj Mcpherson MD  Discharge Physician:  Tess Lloyd NP          Event Leading to Hospitalization and Hospital Stay   Mahsa Ahumada is a 21 year old   female who presented via [facility] after someone had called the police because she fell asleep in somebody's lawn. Mahsa lives in [place] both started driving beucase she felt like \"that is what i was suppose to do.\" her cell phone went dead she became scared, and stopped at somebody's house at 1 in the morning and asked if she could come in. They did not let her come in and then she was found sleeping on their lawn. When I am meeting with her on admission, she is pressurred and euphoric. She laughs multiple times and states she doesn't Know why she is laughing. Her sleep has been very sporadic lately. When i asked why she drove from [place] to [facility] she states \"i just listened to the music on the radio and was empowered by it so i kept praying and driving.\" she states that songs on the radio were played specifically for her. She denied having any AH though did state she could speak with God. She is pressurred. She endorses racing thoughts. shehas had periods of depression in the past she has had periods of amotivation, anhedonia, worthlessness and excess guilt. She has had thoughts that she would be better off dead though has never attempted suicide nor has she had a plan. She has been diagnosed with ADHD and anxiety in the past. She has been taking her zoloft up until 2 days ago. She has no SI or HI.    Cammie was started on Lithium and lamictal while here. She did well on these medications and seemed to stabilize fairly well in relation to her mood symptoms. Cammie still had some paranoid thoughts " however and still endorsed some auditory hallucinations. She was then started on risperdal and this did appear to be beneficial for helping reduce auditory hallucinations and paranoia. Her family came here from [place] to se her and as a support system. She is going to return to [place] with them. And establish outpatient services there.          At time of discharge, there is no evidence that patient is in immediate danger of self or others.        DIagnoses:     Bipolar I most recent episode manic  bulemia nervosa in remission  Anorexia nervous in remission         Labs:   No results found for this or any previous visit (from the past 24 hour(s)).               Discharge Medications:     Current Discharge Medication List      START taking these medications    Details   lamoTRIgine (LAMICTAL) 25 MG tablet Take 1 tablet (25 mg) by mouth daily  Qty: 30 tablet, Refills: 0    Associated Diagnoses: Bipolar 2 disorder (H)      lithium (ESKALITH/LITHOBID) 300 MG CR tablet Take 1 tablet (300 mg) by mouth every 12 hours  Qty: 60 tablet, Refills: 0    Associated Diagnoses: Bipolar 2 disorder (H)      risperiDONE (RISPERDAL M-TABS) 2 MG ODT tab Place 1 tablet (2 mg) under the tongue At Bedtime  Qty: 30 tablet, Refills: 0    Associated Diagnoses: Bipolar 2 disorder (H)         STOP taking these medications       SERTRALINE HCL PO Comments:   Reason for Stopping:                 Justification for dual anti-psychotic use: Not applicable       Psychiatric Examination:   Appearance:  awake, alert, adequately groomed and dressed in hospital scrubs  Attitude:  cooperative  Eye Contact:  good  Mood:  better  Affect:  appropriate and in normal range and mood congruent  Speech:  clear, coherent  Psychomotor Behavior:  no evidence of tardive dyskinesia, dystonia, or tics and intact station, gait and muscle tone  Thought Process:  linear and goal oriented  Associations:  no loose associations  Thought Content:  no evidence of suicidal  ideation or homicidal ideation and no visual hallucinations present  Insight:  fair  Judgment:  intact  Oriented to:  time, person, and place  Attention Span and Concentration:  intact  Recent and Remote Memory:  intact  Fund of Knowledge: appropriate  Muscle Strength and Tone: normal  Gait and Station: Normal  Perception: No perceptual disturbances noted       Discharge Plan:   Psychiatry Follow-up:      [facility]  Medication Management - Kerry Saud - May 26th, @ 1:30 pm.   [address] [place], [address]  Phone: [phone]  Fax: [phone]     [facility]  Medication Management - when back in area.   [address] [place] [address], [address]  [place] [address]  Phone: [phone]  Fax: [phone]    Attestation:  The patient has been seen and evaluated by me,  Tess Lloyd NP         Discharge Services Provided:    60 minutes spent on discharge services, including:  Final examination of patient.  Review and discussion of Hospital stay.  Instructions for continued outpatient care/goals.  Preparation of discharge records.  Preparation of medications refills and new prescriptions.  Preparation of Applicable referral forms.

## 2017-05-15 NOTE — PLAN OF CARE
Face to face end of shift report received from Aliyah ROSAS. Rounding completed. Patient observed in bed.     Svitlana Dent  5/14/2017  11:55 PM

## 2017-05-15 NOTE — PLAN OF CARE
Problem: Goal Outcome Summary  Goal: Goal Outcome Summary  Pt awake most of shift. Pt walking hallways listening to music and then would kneel in the lobby and pray. Pt upset and worried that she will not go to Psychiatric hospital when she dies. Pt given Atarax 50 and Zyprexa 10 at 0228 and pt continued to pace hallways. Pt returned to be around 0500.

## 2017-05-15 NOTE — PLAN OF CARE
"Problem: Goal Outcome Summary  Goal: Goal Outcome Summary  Outcome: Improving  Patient has been up on and unit and has been participating in therapeutic group. She had a full range affect. She endorsed anxiety and stated that it is over \"not knowing what is next!\" Patient verbalized to this writer that she believes that \"we can hear her thoughts!\" She denied depression, hallucinations, homicidal ideation, and suicidal ideation. Patient's parents visited [facility] and were inquiring about her discharge plan as her hold is up on 5-15 at 0530.     Problem: Thought Process Alteration (Adult)  Goal: Improved Thought Process  Pt Will verbalize that her thought process will be more clear by time of discharge   Outcome: No Change  Patient verbalized that she still has racing thoughts.       "

## 2017-05-15 NOTE — PLAN OF CARE
Problem: Discharge Planning  Goal: Discharge Planning (Adult, OB, Behavioral, Peds)  Writer met with pt and discussed discharge plans.  Pt states she is ready to go but not sure about going to live with her parents because she states she does better living alone.  She has a job, apartment and friends in [place].  Writer suggested she take a medical leave from her job so she can go to [place] and stabilize then her job will be waiting for her when she returns to [facility].  Pt stated she like that plan. Pt is discharging at the recommendation of the treatment team. Pt is discharging to parent's home in [place] transported by her parents. Pt denies having any thoughts of hurting themself or anyone else. Pt denies anxiety or depression. Pt has follow up with Kerry Villavicencio with [facility] in [place]. Discharge instructions, including; demographic sheet, psychiatric evaluation, discharge summary, and AVS were faxed to these next level of care providers.

## 2017-05-15 NOTE — DISCHARGE INSTRUCTIONS
Behavioral Discharge Planning and Instructions    Summary: Cammie was admitted with psychosis.     Main Diagnosis: Bipolar I most recent episode manic, bulemia nervosa in remission, Anorexia nervous in remission.    Major Treatments, Procedures and Findings: Stabilize with medications, connect with community programs.     Symptoms to Report: feeling more aggressive, increased confusion, losing more sleep, mood getting worse or thoughts of suicide    Lifestyle Adjustment: Take all medications as prescribed, meet with doctor/ medication provider, out patient therapist, , and [facility] worker as scheduled. Abstain from alcohol or any unprescribed drugs.     Psychiatry Follow-up:     [facility]  Medication Management - Kerry Saud - May 26th, @ 1:30 pm.   [address] [place], [address]  Phone: [phone]  Fax: [phone]    [facility]  Medication Management - when back in area.   [address] [place] [address], [address]  [place] [address]  Phone: [phone]  Fax: [phone]               Resources:   Crisis Intervention: 445.288.2753 or 115-829-1740 (TTY: 404.735.1779).  Call anytime for help.  National [place] on Mental Illness (www.mn.zora.org): 597.868.9538 or 752-282-1850.  Alcoholics Anonymous (www.alcoholics-anonymous.org): Check your phone book for your local chapter.  Suicide Awareness Voices of Education (SAVE) (www.save.org): 612-890-ADRF (9183)  National Suicide Prevention Line (www.mentalhealthmn.org): 484-628-PIIB (1914)  Mental Health Consumer/Survivor Network of [place] (www.mhcsn.net): 237.158.6456 or 892-360-2317  Mental Health Association of [place] (www.mentalhealth.org): 738.103.6090 or 023-016-7829    General Medication Instructions:   See your medication sheet(s) for instructions.   Take all medicines as directed.  Make no changes unless your doctor suggests them.   Go to all your doctor visits.  Be sure to have all your required lab tests. This way, your medicines can be  refilled on time.  Do not use any drugs not prescribed by your doctor.  Avoid alcohol.    Range Area:  [facility], Crisis stabilization [facility]- [phone]  [facility] Crisis Line: 1-978.556.4987  Advocates For Family Peace: 705-4954  Sexual Assault Program of [facility]: 463.975.6505 or 1-902.867.8527  [place] Battered Women's Program: 1-280.988.9025 Ext: 279       Calls answered Mon-Fri-8:00 am--4:30 pm    [place]:  Advocates for Family Peace: 1-989.392.5931  [facility] first call for help: 5-838-599-0992  [facility] Crisis Center:  (451) 276-5220      [place] Area:  Warm Line: 1-411.516.7167       Calls answered Tuesday--Saturday 4:00 pm--10:00 pm  [facility] Crisis Line - [phone]  [facility] Crisis Stabilization 657-106-1374    [place] Statewide:  [place] Crisis and Referral Services: 1-438.616.6150  National Suicide Prevention Lifeline: 1-310-617-TALK (7806)   - jsj7bado- Text  Life  to 83929  First Call for Help: 2-1-1  MELINDA Helpline- 2-598-VLRG-HELP

## 2017-05-15 NOTE — PLAN OF CARE
"Discharge Note    Patient Discharged to home on 5/15/2017 12:35 PM via Private Car accompanied by discharge planner.     Patient informed of discharge instructions in AVS. patient verbalizes understanding and denies having any questions pertaining to AVS. Patient stable at time of discharge. Patient denies SI, HI, and thoughts of self harm at time of discharge. All personal belongings returned to patient. Discharge prescriptions sent to [facility] via electronic communication. Psych evaluation, history and physical, AVS, and discharge summary faxed to next level of care- per discharge planner.     Neeta LEXAMere Prado  5/15/2017  12:43 PM        Problem: Goal Outcome Summary  Goal: Goal Outcome Summary  Outcome: Improving  Patient has been calm, cooperative this shift. Patient has denied any pain, SI, HI, and hallucinations. Patient stated that she feels that is she sleeps she will go to hell. She also she feels like she will go to hell in general and she is very scared of that. Patient appears depressed, but is hopeful that things will get better. Patient denies any suicidal thoughts, then stated \"well I have an eating disorder so I guess that is hurting myself\". Patient worried that family does not understand her issues and that it will affect how they support her through this. Patient is anticipating discharging, but is scared she says. Will continue to monitor.     Problem: Thought Process Alteration (Adult)  Goal: Improved Thought Process  Pt Will verbalize that her thought process will be more clear by time of discharge   Outcome: Improving  Patient seems to have clearer thought process, but is still reporting paranoid thoughts.      "

## 2017-05-15 NOTE — PLAN OF CARE
Problem: Discharge Planning  Goal: Discharge Planning (Adult, OB, Behavioral, Peds)  Outcome: No Change  Facesheet, H&P, discharge summary, and AVS sent to next plan of care, [place].